# Patient Record
Sex: MALE | Race: WHITE | Employment: FULL TIME | URBAN - METROPOLITAN AREA
[De-identification: names, ages, dates, MRNs, and addresses within clinical notes are randomized per-mention and may not be internally consistent; named-entity substitution may affect disease eponyms.]

---

## 2019-06-08 ENCOUNTER — APPOINTMENT (OUTPATIENT)
Dept: GENERAL RADIOLOGY | Age: 44
End: 2019-06-08
Payer: COMMERCIAL

## 2019-06-08 ENCOUNTER — HOSPITAL ENCOUNTER (EMERGENCY)
Age: 44
Discharge: HOME | End: 2019-06-08
Payer: COMMERCIAL

## 2019-06-08 VITALS
OXYGEN SATURATION: 100 % | HEIGHT: 71 IN | WEIGHT: 165 LBS | BODY MASS INDEX: 23.1 KG/M2 | TEMPERATURE: 98.6 F | DIASTOLIC BLOOD PRESSURE: 99 MMHG | RESPIRATION RATE: 16 BRPM | SYSTOLIC BLOOD PRESSURE: 140 MMHG | HEART RATE: 74 BPM

## 2019-06-08 DIAGNOSIS — R00.2 PALPITATIONS: Primary | ICD-10-CM

## 2019-06-08 LAB
ALBUMIN SERPL-MCNC: 4.1 G/DL (ref 3.5–4.6)
ALP BLD-CCNC: 67 U/L (ref 35–104)
ALT SERPL-CCNC: 14 U/L (ref 0–41)
AMPHETAMINE SCREEN, URINE: ABNORMAL
ANION GAP SERPL CALCULATED.3IONS-SCNC: 8 MEQ/L (ref 9–15)
AST SERPL-CCNC: 18 U/L (ref 0–40)
BARBITURATE SCREEN URINE: ABNORMAL
BASOPHILS ABSOLUTE: 0.1 K/UL (ref 0–0.2)
BASOPHILS RELATIVE PERCENT: 0.8 %
BENZODIAZEPINE SCREEN, URINE: ABNORMAL
BILIRUB SERPL-MCNC: 0.6 MG/DL (ref 0.2–0.7)
BILIRUBIN URINE: NEGATIVE
BLOOD, URINE: NEGATIVE
BUN BLDV-MCNC: 15 MG/DL (ref 6–20)
CALCIUM SERPL-MCNC: 9 MG/DL (ref 8.5–9.9)
CANNABINOID SCREEN URINE: POSITIVE
CHLORIDE BLD-SCNC: 101 MEQ/L (ref 95–107)
CLARITY: CLEAR
CO2: 24 MEQ/L (ref 20–31)
COCAINE METABOLITE SCREEN URINE: ABNORMAL
COLOR: YELLOW
CREAT SERPL-MCNC: 0.78 MG/DL (ref 0.7–1.2)
D DIMER: <0.19 MG/L FEU (ref 0–0.5)
EKG ATRIAL RATE: 69 BPM
EKG P AXIS: 48 DEGREES
EKG P-R INTERVAL: 142 MS
EKG Q-T INTERVAL: 388 MS
EKG QRS DURATION: 86 MS
EKG QTC CALCULATION (BAZETT): 415 MS
EKG R AXIS: 36 DEGREES
EKG T AXIS: 56 DEGREES
EKG VENTRICULAR RATE: 69 BPM
EOSINOPHILS ABSOLUTE: 0.3 K/UL (ref 0–0.7)
EOSINOPHILS RELATIVE PERCENT: 2.7 %
ETHANOL PERCENT: NORMAL G/DL
ETHANOL: <10 MG/DL (ref 0–0.08)
GFR AFRICAN AMERICAN: >60
GFR NON-AFRICAN AMERICAN: >60
GLOBULIN: 2.5 G/DL (ref 2.3–3.5)
GLUCOSE BLD-MCNC: 93 MG/DL (ref 70–99)
GLUCOSE URINE: NEGATIVE MG/DL
HCT VFR BLD CALC: 41.8 % (ref 42–52)
HEMOGLOBIN: 14.8 G/DL (ref 14–18)
KETONES, URINE: ABNORMAL MG/DL
LEUKOCYTE ESTERASE, URINE: NEGATIVE
LYMPHOCYTES ABSOLUTE: 2 K/UL (ref 1–4.8)
LYMPHOCYTES RELATIVE PERCENT: 19.3 %
Lab: ABNORMAL
MAGNESIUM: 2.3 MG/DL (ref 1.7–2.4)
MCH RBC QN AUTO: 32.5 PG (ref 27–31.3)
MCHC RBC AUTO-ENTMCNC: 35.5 % (ref 33–37)
MCV RBC AUTO: 91.7 FL (ref 80–100)
MONOCYTES ABSOLUTE: 1 K/UL (ref 0.2–0.8)
MONOCYTES RELATIVE PERCENT: 9.4 %
NEUTROPHILS ABSOLUTE: 6.9 K/UL (ref 1.4–6.5)
NEUTROPHILS RELATIVE PERCENT: 67.8 %
NITRITE, URINE: NEGATIVE
OPIATE SCREEN URINE: ABNORMAL
PDW BLD-RTO: 12.6 % (ref 11.5–14.5)
PH UA: 7.5 (ref 5–9)
PHENCYCLIDINE SCREEN URINE: ABNORMAL
PLATELET # BLD: 279 K/UL (ref 130–400)
POTASSIUM SERPL-SCNC: 4 MEQ/L (ref 3.4–4.9)
PROTEIN UA: NEGATIVE MG/DL
RBC # BLD: 4.56 M/UL (ref 4.7–6.1)
SODIUM BLD-SCNC: 133 MEQ/L (ref 135–144)
SPECIFIC GRAVITY UA: 1.02 (ref 1–1.03)
TOTAL CK: 183 U/L (ref 0–190)
TOTAL PROTEIN: 6.6 G/DL (ref 6.3–8)
TROPONIN: <0.01 NG/ML (ref 0–0.01)
TSH REFLEX: 1.52 UIU/ML (ref 0.44–3.86)
URINE REFLEX TO CULTURE: ABNORMAL
UROBILINOGEN, URINE: 1 E.U./DL
WBC # BLD: 10.1 K/UL (ref 4.8–10.8)

## 2019-06-08 PROCEDURE — 2580000003 HC RX 258: Performed by: NURSE PRACTITIONER

## 2019-06-08 PROCEDURE — 71045 X-RAY EXAM CHEST 1 VIEW: CPT

## 2019-06-08 PROCEDURE — 93005 ELECTROCARDIOGRAM TRACING: CPT | Performed by: NURSE PRACTITIONER

## 2019-06-08 PROCEDURE — 84484 ASSAY OF TROPONIN QUANT: CPT

## 2019-06-08 PROCEDURE — 82550 ASSAY OF CK (CPK): CPT

## 2019-06-08 PROCEDURE — 85025 COMPLETE CBC W/AUTO DIFF WBC: CPT

## 2019-06-08 PROCEDURE — 81003 URINALYSIS AUTO W/O SCOPE: CPT

## 2019-06-08 PROCEDURE — 85379 FIBRIN DEGRADATION QUANT: CPT

## 2019-06-08 PROCEDURE — 36415 COLL VENOUS BLD VENIPUNCTURE: CPT

## 2019-06-08 PROCEDURE — 80053 COMPREHEN METABOLIC PANEL: CPT

## 2019-06-08 PROCEDURE — G0480 DRUG TEST DEF 1-7 CLASSES: HCPCS

## 2019-06-08 PROCEDURE — 80307 DRUG TEST PRSMV CHEM ANLYZR: CPT

## 2019-06-08 PROCEDURE — 84443 ASSAY THYROID STIM HORMONE: CPT

## 2019-06-08 PROCEDURE — 99285 EMERGENCY DEPT VISIT HI MDM: CPT

## 2019-06-08 PROCEDURE — 83735 ASSAY OF MAGNESIUM: CPT

## 2019-06-08 RX ORDER — 0.9 % SODIUM CHLORIDE 0.9 %
1000 INTRAVENOUS SOLUTION INTRAVENOUS ONCE
Status: COMPLETED | OUTPATIENT
Start: 2019-06-08 | End: 2019-06-08

## 2019-06-08 RX ADMIN — SODIUM CHLORIDE 1000 ML: 9 INJECTION, SOLUTION INTRAVENOUS at 16:37

## 2019-06-08 ASSESSMENT — ENCOUNTER SYMPTOMS
VOMITING: 0
EYE PAIN: 0
ABDOMINAL PAIN: 0
DIARRHEA: 0
COUGH: 0
SHORTNESS OF BREATH: 0
BACK PAIN: 0
SORE THROAT: 0
PHOTOPHOBIA: 0
NAUSEA: 0
RHINORRHEA: 0

## 2019-06-08 NOTE — ED TRIAGE NOTES
Pt presents to the ED with c/o tachycardia. Pt states he was driving home from work last night and he felt a fluttering sensation in his chest. Upon assessment, pt is A/Ox4, skin p/w/d, resp even and unlabored.

## 2019-06-08 NOTE — ED PROVIDER NOTES
[facility] ED  eMERGENCY dEPARTMENT eNCOUnter      Pt Name: Bong Prater  MRN: [id number]  [place] 1975  Date of evaluation: 6/8/2019  Provider: LENY Ramires - CNP    CHIEF COMPLAINT       Chief Complaint   Patient presents with    Tachycardia     started last night after work         HISTORY OF PRESENT ILLNESS   (Location/Symptom, Timing/Onset,Context/Setting, Quality, Duration, Modifying Factors, Severity)  Note limiting factors. Bong Prater is a 41 y.o. male who presents to the emergency department with complaint of palpitations. He admits that he started having palpitations yesterday while at work which she feels is due to increased stress. He feels his heart is pounding and fast and rate. He admits to multiple stressors within his life that includes an upcoming birthday of his daughter who will be turning 1, transfer to another location for work. He denies any headache dizziness lightheadedness fever sweats or chills chest pain shortness of breath difficulty breathing and orthopnea and dyspnea on exertion nausea vomiting diarrhea constipation and abdominal pain or diaphoresis. No recent extended travel or lower extremity, upper extremity immobilization      Location/Symptom - palpitations  Timing/Onset - yesterday  Context/Setting - as above  Quality - pounding, fast  Duration - multiple episodes each with a different duration  Modifying Factors - stress, otherwise none  Severity - moderate    Nursing Notes were reviewed. REVIEW OF SYSTEMS    (2-9 systems for level 4, 10 or more for level 5)     Review of Systems   Constitutional: Negative for chills, diaphoresis, fatigue and fever. HENT: Negative for congestion, rhinorrhea and sore throat. Eyes: Negative for photophobia and pain. Respiratory: Negative for cough and shortness of breath. Cardiovascular: Positive for palpitations. Negative for chest pain.    Gastrointestinal: Negative for abdominal pain, diarrhea, 1613]   BP Temp Temp Source Pulse Resp SpO2 Height Weight   (!) 128/93 98.6 °F (37 °C) Oral 83 18 97 % 5' 11\" (1.803 m) 165 lb (74.8 kg)       Physical Exam   Constitutional: He is oriented to person, place, and time. He appears well-developed and well-nourished. He is active. No distress. HENT:   Head: Normocephalic and atraumatic. Mouth/Throat: Mucous membranes are normal.   Eyes: Conjunctivae and lids are normal.   Neck: Normal range of motion. Neck supple. Cardiovascular: Normal rate, regular rhythm, normal heart sounds, intact distal pulses and normal pulses. Pulmonary/Chest: Effort normal and breath sounds normal.   Abdominal: Soft. Normal appearance and bowel sounds are normal. There is no tenderness. Lymphadenopathy:     He has no cervical adenopathy. Neurological: He is alert and oriented to person, place, and time. He has normal strength. Skin: Skin is warm, dry and intact. Capillary refill takes less than 2 seconds. No rash noted. He is not diaphoretic. Psychiatric: Judgment and thought content normal.   Nursing note and vitals reviewed. DIAGNOSTIC RESULTS     EKG: All EKG's are interpreted by the Emergency Department Physician who either signs or Co-signsthis chart in the absence of a cardiologist.    Normal sinus rhythm at 69 bpm.  No acute ST segment changes    RADIOLOGY:   Non-plain filmimages such as CT, Ultrasound and MRI are read by the radiologist. Plain radiographic images are visualized and preliminarily interpreted by the emergency physician with the below findings:    Chest X-Ray demonstrates no acute infiltrate, effusion or pneumothorax.     Interpretation per the Radiologist below, if available at the time ofthis note:    XR CHEST PORTABLE    (Results Pending)         ED BEDSIDE ULTRASOUND:   Performed by ED Physician - none    LABS:  Labs Reviewed   CBC WITH AUTO DIFFERENTIAL - Abnormal; Notable for the following components:       Result Value    RBC 4.56 (*) Hematocrit 41.8 (*)     MCH 32.5 (*)     Neutrophils # 6.9 (*)     Monocytes # 1.0 (*)     All other components within normal limits   COMPREHENSIVE METABOLIC PANEL - Abnormal; Notable for the following components:    Sodium 133 (*)     Anion Gap 8 (*)     All other components within normal limits   TROPONIN   CK   MAGNESIUM   TSH WITH REFLEX   D-DIMER, QUANTITATIVE   ETHANOL   URINE RT REFLEX TO CULTURE   URINE DRUG SCREEN       All other labs were within normal range or not returned as of this dictation. EMERGENCY DEPARTMENT COURSE and DIFFERENTIAL DIAGNOSIS/MDM:   Vitals:    Vitals:    06/08/19 1613   BP: (!) 128/93   Pulse: 83   Resp: 18   Temp: 98.6 °F (37 °C)   TempSrc: Oral   SpO2: 97%   Weight: 165 lb (74.8 kg)   Height: 5' 11\" (1.803 m)            MDM on exam patient is nontoxic in no distress. Vital signs are normal.  Exam is grossly unremarkable. Given his complaint of palpitations laboratory and radiology studies were obtained for evaluation of acute pathology. He was provided IV fluid. He'll be reevaluated. Reevaluated. No arrythmia t/o ed evaluation. No complaints of palpitations while in ED. W/u negative for acute pathology. Will advised to f/u with PCP and cards. Extended discharge instructions provided to patient including signs, symptoms and red flags that they should return to the emergency department. They express good understanding. Standard anticipatory guidance given to patient upon discharge. Have given them a specific time frame in which to follow-up and who to follow-up with. I have also advised them that they should return to the emergency department if they get worse, or not getting better or develop any new or concerning symptoms. Patient demonstrates understanding and all questions were answered. CRITICAL CARE TIME       CONSULTS:  None    PROCEDURES:  Unless otherwise noted below, none     Procedures    FINAL IMPRESSION      1.  Palpitations          DISPOSITION/PLAN DISPOSITION Decision To Discharge 06/08/2019 05:25:51 PM      PATIENT REFERRED TO:  PCP    Call in 2 days  For continued evaluation and management    DO Lyn  [address]  [address] [facility]  [phone]    Call in 2 days  For continued evaluation and management      DISCHARGE MEDICATIONS:  New Prescriptions    No medications on file          (Please notethat portions of this note were completed with a voice recognition program.  Efforts were made to edit the dictations but occasionally words are mis-transcribed.)    LENY Haley CNP (electronically signed)  Attending Emergency Physician          LENY Haley CNP  06/08/19 2543

## 2019-06-08 NOTE — ED NOTES
Pt aware urine specimen is needed. Pt states he is unable to go at this time.      Cole Paulino RN  06/08/19 [id number]

## 2019-06-10 PROCEDURE — 93010 ELECTROCARDIOGRAM REPORT: CPT | Performed by: INTERNAL MEDICINE

## 2023-12-12 ENCOUNTER — OFFICE VISIT (OUTPATIENT)
Dept: URGENT CARE | Facility: CLINIC | Age: 48
End: 2023-12-12
Payer: COMMERCIAL

## 2023-12-12 VITALS
DIASTOLIC BLOOD PRESSURE: 100 MMHG | TEMPERATURE: 98.5 F | RESPIRATION RATE: 18 BRPM | HEART RATE: 77 BPM | SYSTOLIC BLOOD PRESSURE: 148 MMHG | OXYGEN SATURATION: 98 %

## 2023-12-12 DIAGNOSIS — H69.92 EUSTACHIAN TUBE DYSFUNCTION, LEFT: ICD-10-CM

## 2023-12-12 DIAGNOSIS — H00.014 HORDEOLUM EXTERNUM OF LEFT UPPER EYELID: Primary | ICD-10-CM

## 2023-12-12 PROCEDURE — 99204 OFFICE O/P NEW MOD 45 MIN: CPT | Performed by: PHYSICIAN ASSISTANT

## 2023-12-12 RX ORDER — FLUTICASONE PROPIONATE 50 MCG
2 SPRAY, SUSPENSION (ML) NASAL DAILY
Qty: 16 G | Refills: 0 | Status: SHIPPED | OUTPATIENT
Start: 2023-12-12 | End: 2024-12-11

## 2023-12-12 RX ORDER — ERYTHROMYCIN 5 MG/G
OINTMENT OPHTHALMIC 3 TIMES DAILY
Qty: 3.5 G | Refills: 0 | Status: SHIPPED | OUTPATIENT
Start: 2023-12-12 | End: 2023-12-17

## 2023-12-12 ASSESSMENT — ENCOUNTER SYMPTOMS
ALLERGIC/IMMUNOLOGIC NEGATIVE: 1
NEUROLOGICAL NEGATIVE: 1
ENDOCRINE NEGATIVE: 1
CARDIOVASCULAR NEGATIVE: 1
PSYCHIATRIC NEGATIVE: 1
EYE PAIN: 0
COUGH: 0
EYE DISCHARGE: 0
GASTROINTESTINAL NEGATIVE: 1
MUSCULOSKELETAL NEGATIVE: 1
HEMATOLOGIC/LYMPHATIC NEGATIVE: 1
FEVER: 1
ADENOPATHY: 0

## 2023-12-12 NOTE — PATIENT INSTRUCTIONS
OTC decongestant as directed  Pcp follow up this week if not improving or worsening  ER visit anytime 24/7 for acute worsening or changing condition

## 2023-12-12 NOTE — PROGRESS NOTES
"Subjective   Patient ID: Wilfredo Hinds is a 48 y.o. male.      History provided by:  Patient   used: No    Ear Fullness  Associated symptoms: congestion and fever    Associated symptoms: no cough and no ear pain      This is a 48 yr old male here for low grade temp, left upper eyelid \"lump\", left ear \"plugged up\" and clear nasal congestion x 4 days. No cough, ear pain, or sore throat. No globe pain or eye drainage.     Review of Systems   Constitutional:  Positive for fever.   HENT:  Positive for congestion. Negative for ear pain.    Eyes:  Negative for pain and discharge.   Respiratory:  Negative for cough.    Cardiovascular: Negative.    Gastrointestinal: Negative.    Endocrine: Negative.    Genitourinary: Negative.    Musculoskeletal: Negative.    Skin: Negative.    Allergic/Immunologic: Negative.    Neurological: Negative.    Hematological: Negative.  Negative for adenopathy.   Psychiatric/Behavioral: Negative.     All other systems reviewed and are negative.  No past medical history on file.  No current outpatient medications on file prior to visit.     No current facility-administered medications on file prior to visit.     No Known Allergies  BP (!) 148/100   Pulse 77   Temp 36.9 °C (98.5 °F)   Resp 18   SpO2 98%   Objective   Physical Exam  Vitals and nursing note reviewed.   Constitutional:       Appearance: Normal appearance.   HENT:      Head: Normocephalic and atraumatic.      Right Ear: Tympanic membrane and ear canal normal.      Left Ear: Ear canal normal.      Ears:      Comments: Left ear-TM with serous fluid, no TM erythema     Mouth/Throat:      Mouth: Mucous membranes are moist.      Pharynx: Oropharynx is clear.   Eyes:      Conjunctiva/sclera: Conjunctivae normal.      Pupils: Pupils are equal, round, and reactive to light.      Comments: Left upper eyelid with stye vs early chalazion formation   Cardiovascular:      Rate and Rhythm: Normal rate and regular rhythm. "   Pulmonary:      Effort: Pulmonary effort is normal.      Breath sounds: Normal breath sounds.   Musculoskeletal:      Cervical back: Neck supple.   Lymphadenopathy:      Cervical: No cervical adenopathy.   Skin:     General: Skin is warm and dry.   Neurological:      General: No focal deficit present.      Mental Status: He is alert and oriented to person, place, and time.   Psychiatric:         Mood and Affect: Mood normal.         Behavior: Behavior normal.     Assessment:  Stye left upper eyelid  Left eustachian tube dysfunction    Plan:  Erythromycin ophthalmic ointment tid  OTC decongestant/antihistamine as directed  Flonase nasal spray as directed  Pcp follow up this week if not improving or worsening  ER visit anytime 24/7 for acute worsening or changing condition

## 2023-12-26 ENCOUNTER — OFFICE VISIT (OUTPATIENT)
Dept: URGENT CARE | Facility: CLINIC | Age: 48
End: 2023-12-26
Payer: COMMERCIAL

## 2023-12-26 VITALS
SYSTOLIC BLOOD PRESSURE: 124 MMHG | WEIGHT: 165 LBS | BODY MASS INDEX: 25.09 KG/M2 | TEMPERATURE: 98.4 F | RESPIRATION RATE: 16 BRPM | OXYGEN SATURATION: 97 % | HEART RATE: 87 BPM | DIASTOLIC BLOOD PRESSURE: 89 MMHG

## 2023-12-26 DIAGNOSIS — J32.9 SINUSITIS, UNSPECIFIED CHRONICITY, UNSPECIFIED LOCATION: Primary | ICD-10-CM

## 2023-12-26 DIAGNOSIS — H00.16 ACUTE CHALAZION OF LEFT EYE: ICD-10-CM

## 2023-12-26 PROCEDURE — 99214 OFFICE O/P EST MOD 30 MIN: CPT | Performed by: PHYSICIAN ASSISTANT

## 2023-12-26 RX ORDER — AMOXICILLIN 875 MG/1
875 TABLET, FILM COATED ORAL 2 TIMES DAILY
Qty: 20 TABLET | Refills: 0 | Status: SHIPPED | OUTPATIENT
Start: 2023-12-26 | End: 2024-01-05

## 2023-12-26 ASSESSMENT — PAIN SCALES - GENERAL: PAINLEVEL: 6

## 2023-12-26 ASSESSMENT — ENCOUNTER SYMPTOMS
COUGH: 0
GASTROINTESTINAL NEGATIVE: 1
CARDIOVASCULAR NEGATIVE: 1
HEMATOLOGIC/LYMPHATIC NEGATIVE: 1
ALLERGIC/IMMUNOLOGIC NEGATIVE: 1
ENDOCRINE NEGATIVE: 1
SORE THROAT: 0
PSYCHIATRIC NEGATIVE: 1
EYES NEGATIVE: 1
MUSCULOSKELETAL NEGATIVE: 1
NEUROLOGICAL NEGATIVE: 1

## 2023-12-26 NOTE — PATIENT INSTRUCTIONS
Continue flonase spray as directed  OTC decongestant as directed  See ophthalmology about eyelid cyst  Pcp follow up this week if not improving or worsening  ER visit anytime 24/7 for acute worsening or changing condition

## 2023-12-26 NOTE — PROGRESS NOTES
"Subjective   Patient ID: Wilfredo Hinds is a 48 y.o. male.      History provided by:  Patient   used: No    Earache   Pertinent negatives include no coughing or sore throat.   .edhpi  This is a 48 yr old male here for persistant sxs. Left upper eyelid \"\"lump\" persists despite using antibiotic eye ointment. He also has left ear pain, yellow/clear nasal congestion that has been persistant x 1 month. No sore throat or cough. Using OTC decongestant and flonase spray without relief.   Review of Systems   HENT:  Positive for congestion and ear pain. Negative for sore throat.    Eyes: Negative.    Respiratory:  Negative for cough.    Cardiovascular: Negative.    Gastrointestinal: Negative.    Endocrine: Negative.    Genitourinary: Negative.    Musculoskeletal: Negative.    Skin: Negative.    Allergic/Immunologic: Negative.    Neurological: Negative.    Hematological: Negative.    Psychiatric/Behavioral: Negative.     All other systems reviewed and are negative.  /89   Pulse 87   Temp 36.9 °C (98.4 °F)   Resp 16   Wt 74.8 kg (165 lb)   SpO2 97%   BMI 25.09 kg/m²     Objective   Physical Exam  Vitals and nursing note reviewed.   Constitutional:       Appearance: Normal appearance.   HENT:      Head: Normocephalic and atraumatic.      Right Ear: Tympanic membrane and ear canal normal.      Left Ear: Tympanic membrane and ear canal normal.      Mouth/Throat:      Mouth: Mucous membranes are moist.      Pharynx: Oropharynx is clear.   Eyes:      Conjunctiva/sclera: Conjunctivae normal.      Comments: Small chalazion left upper eyelid,   Cardiovascular:      Rate and Rhythm: Normal rate and regular rhythm.   Pulmonary:      Effort: Pulmonary effort is normal.      Breath sounds: Normal breath sounds.   Musculoskeletal:      Cervical back: Neck supple.   Lymphadenopathy:      Cervical: No cervical adenopathy.   Skin:     General: Skin is warm and dry.   Neurological:      General: No focal deficit " present.      Mental Status: He is alert and oriented to person, place, and time.   Psychiatric:         Mood and Affect: Mood normal.         Behavior: Behavior normal.     Assessment:  Chalazion left upper eyelid  Sinusitis    Plan:  Amoxicillin 875 mg bid x 10 days  Continue flonase and decongestant rx as directed  Ophthalmology follow for eye  Pcp follow up this week if not improving or worsening  ER visit anytime 24/7 for acute worsening or changing condition

## 2024-08-30 ENCOUNTER — APPOINTMENT (OUTPATIENT)
Dept: PRIMARY CARE | Facility: CLINIC | Age: 49
End: 2024-08-30
Payer: COMMERCIAL

## 2024-08-30 ENCOUNTER — LAB (OUTPATIENT)
Dept: LAB | Facility: LAB | Age: 49
End: 2024-08-30
Payer: COMMERCIAL

## 2024-08-30 VITALS
SYSTOLIC BLOOD PRESSURE: 128 MMHG | HEART RATE: 64 BPM | WEIGHT: 157 LBS | BODY MASS INDEX: 23.79 KG/M2 | TEMPERATURE: 98.1 F | DIASTOLIC BLOOD PRESSURE: 88 MMHG | HEIGHT: 68 IN | RESPIRATION RATE: 20 BRPM

## 2024-08-30 DIAGNOSIS — Z23 IMMUNIZATION DUE: ICD-10-CM

## 2024-08-30 DIAGNOSIS — Z00.00 ENCOUNTER FOR ANNUAL PHYSICAL EXAM: ICD-10-CM

## 2024-08-30 DIAGNOSIS — R49.0 HOARSENESS: ICD-10-CM

## 2024-08-30 DIAGNOSIS — Z12.11 COLON CANCER SCREENING: ICD-10-CM

## 2024-08-30 DIAGNOSIS — H91.93 BILATERAL HEARING LOSS, UNSPECIFIED HEARING LOSS TYPE: ICD-10-CM

## 2024-08-30 DIAGNOSIS — Z00.00 ENCOUNTER FOR ANNUAL PHYSICAL EXAM: Primary | ICD-10-CM

## 2024-08-30 DIAGNOSIS — Z13.31 SCREENING FOR DEPRESSION: ICD-10-CM

## 2024-08-30 DIAGNOSIS — H54.7 DECREASED VISUAL ACUITY: ICD-10-CM

## 2024-08-30 DIAGNOSIS — Z72.0 TOBACCO USE: ICD-10-CM

## 2024-08-30 PROBLEM — I99.8 ISCHEMIC FINGER: Status: ACTIVE | Noted: 2024-08-30

## 2024-08-30 PROBLEM — M77.9 BONE SPUR: Status: RESOLVED | Noted: 2024-08-30 | Resolved: 2024-08-30

## 2024-08-30 PROBLEM — H91.90 HEARING LOSS: Status: ACTIVE | Noted: 2024-08-30

## 2024-08-30 PROBLEM — E55.9 VITAMIN D DEFICIENCY: Status: ACTIVE | Noted: 2024-08-30

## 2024-08-30 PROBLEM — J06.9 URI WITH COUGH AND CONGESTION: Status: RESOLVED | Noted: 2024-08-30 | Resolved: 2024-08-30

## 2024-08-30 PROBLEM — E78.1 HIGH TRIGLYCERIDES: Status: ACTIVE | Noted: 2024-08-30

## 2024-08-30 LAB
25(OH)D3 SERPL-MCNC: 28 NG/ML (ref 30–100)
ALBUMIN SERPL BCP-MCNC: 4.2 G/DL (ref 3.4–5)
ALP SERPL-CCNC: 60 U/L (ref 33–120)
ALT SERPL W P-5'-P-CCNC: 34 U/L (ref 10–52)
ANION GAP SERPL CALC-SCNC: 15 MMOL/L (ref 10–20)
AST SERPL W P-5'-P-CCNC: 36 U/L (ref 9–39)
BILIRUB SERPL-MCNC: 1.1 MG/DL (ref 0–1.2)
BUN SERPL-MCNC: 18 MG/DL (ref 6–23)
CALCIUM SERPL-MCNC: 9.4 MG/DL (ref 8.6–10.3)
CHLORIDE SERPL-SCNC: 104 MMOL/L (ref 98–107)
CHOLEST SERPL-MCNC: 125 MG/DL (ref 0–199)
CHOLESTEROL/HDL RATIO: 2.5
CO2 SERPL-SCNC: 23 MMOL/L (ref 21–32)
CREAT SERPL-MCNC: 0.98 MG/DL (ref 0.5–1.3)
EGFRCR SERPLBLD CKD-EPI 2021: >90 ML/MIN/1.73M*2
ERYTHROCYTE [DISTWIDTH] IN BLOOD BY AUTOMATED COUNT: 13.2 % (ref 11.5–14.5)
GLUCOSE SERPL-MCNC: 99 MG/DL (ref 74–99)
HCT VFR BLD AUTO: 44.8 % (ref 41–52)
HDLC SERPL-MCNC: 49.2 MG/DL
HGB BLD-MCNC: 14.9 G/DL (ref 13.5–17.5)
LDLC SERPL CALC-MCNC: 34 MG/DL
MCH RBC QN AUTO: 31.8 PG (ref 26–34)
MCHC RBC AUTO-ENTMCNC: 33.3 G/DL (ref 32–36)
MCV RBC AUTO: 96 FL (ref 80–100)
NON HDL CHOLESTEROL: 76 MG/DL (ref 0–149)
NRBC BLD-RTO: 0 /100 WBCS (ref 0–0)
PLATELET # BLD AUTO: 374 X10*3/UL (ref 150–450)
POTASSIUM SERPL-SCNC: 4.1 MMOL/L (ref 3.5–5.3)
PROT SERPL-MCNC: 7 G/DL (ref 6.4–8.2)
RBC # BLD AUTO: 4.69 X10*6/UL (ref 4.5–5.9)
SODIUM SERPL-SCNC: 138 MMOL/L (ref 136–145)
TRIGL SERPL-MCNC: 209 MG/DL (ref 0–149)
TSH SERPL-ACNC: 2.41 MIU/L (ref 0.44–3.98)
VLDL: 42 MG/DL (ref 0–40)
WBC # BLD AUTO: 15.2 X10*3/UL (ref 4.4–11.3)

## 2024-08-30 PROCEDURE — 80053 COMPREHEN METABOLIC PANEL: CPT

## 2024-08-30 PROCEDURE — 36415 COLL VENOUS BLD VENIPUNCTURE: CPT

## 2024-08-30 PROCEDURE — 84443 ASSAY THYROID STIM HORMONE: CPT

## 2024-08-30 PROCEDURE — 99213 OFFICE O/P EST LOW 20 MIN: CPT | Performed by: FAMILY MEDICINE

## 2024-08-30 PROCEDURE — 90715 TDAP VACCINE 7 YRS/> IM: CPT | Performed by: FAMILY MEDICINE

## 2024-08-30 PROCEDURE — 96127 BRIEF EMOTIONAL/BEHAV ASSMT: CPT | Performed by: FAMILY MEDICINE

## 2024-08-30 PROCEDURE — 99396 PREV VISIT EST AGE 40-64: CPT | Performed by: FAMILY MEDICINE

## 2024-08-30 PROCEDURE — 85027 COMPLETE CBC AUTOMATED: CPT

## 2024-08-30 PROCEDURE — 90471 IMMUNIZATION ADMIN: CPT | Performed by: FAMILY MEDICINE

## 2024-08-30 PROCEDURE — 82306 VITAMIN D 25 HYDROXY: CPT

## 2024-08-30 PROCEDURE — 80061 LIPID PANEL: CPT

## 2024-08-30 RX ORDER — BUPROPION HYDROCHLORIDE 150 MG/1
150 TABLET, EXTENDED RELEASE ORAL 2 TIMES DAILY
Qty: 60 TABLET | Refills: 11 | Status: SHIPPED | OUTPATIENT
Start: 2024-08-30 | End: 2025-08-30

## 2024-08-30 ASSESSMENT — PATIENT HEALTH QUESTIONNAIRE - PHQ9
1. LITTLE INTEREST OR PLEASURE IN DOING THINGS: NOT AT ALL
2. FEELING DOWN, DEPRESSED OR HOPELESS: NOT AT ALL
SUM OF ALL RESPONSES TO PHQ9 QUESTIONS 1 AND 2: 0

## 2024-08-30 ASSESSMENT — ENCOUNTER SYMPTOMS
WHEEZING: 0
COUGH: 0
DYSPHORIC MOOD: 0
FATIGUE: 0
RHINORRHEA: 0
HEMATURIA: 0
BLOOD IN STOOL: 0
ARTHRALGIAS: 1
VOMITING: 0
BRUISES/BLEEDS EASILY: 0
FEVER: 0
DIFFICULTY URINATING: 0
PALPITATIONS: 0
NERVOUS/ANXIOUS: 0
SHORTNESS OF BREATH: 0
NAUSEA: 0
SORE THROAT: 0
CONSTIPATION: 0
BACK PAIN: 0
SEIZURES: 0
DIARRHEA: 0

## 2024-08-30 NOTE — ASSESSMENT & PLAN NOTE
Encouraged to quit  Pt would like to quit, tried chantix ,had side effects  Ho printed  Pt would like to try zyban

## 2024-08-30 NOTE — PROGRESS NOTES
"Subjective   Patient ID: Wilfredo Hinds is a 48 y.o. male who presents for Annual Exam.    HPI     Review of Systems   Constitutional:  Negative for fatigue and fever.   HENT:  Positive for hearing loss. Negative for congestion, ear pain, rhinorrhea and sore throat.         Pt has throat clearing, horseness  Pt is a smoker   Eyes:  Positive for visual disturbance.        Pt needs eye appt   Respiratory:  Negative for cough, shortness of breath and wheezing.    Cardiovascular:  Negative for chest pain and palpitations.   Gastrointestinal:  Negative for blood in stool, constipation, diarrhea, nausea and vomiting.   Endocrine: Negative for cold intolerance and heat intolerance.   Genitourinary:  Negative for difficulty urinating, hematuria, scrotal swelling and testicular pain.   Musculoskeletal:  Positive for arthralgias. Negative for back pain.        Left knee left arm   Skin:  Negative for rash.   Neurological:  Negative for seizures and syncope.   Hematological:  Does not bruise/bleed easily.   Psychiatric/Behavioral:  Negative for dysphoric mood and suicidal ideas. The patient is not nervous/anxious.        Objective   /88   Pulse 64   Temp 36.7 °C (98.1 °F)   Resp 20   Ht 1.727 m (5' 8\")   Wt 71.2 kg (157 lb)   BMI 23.87 kg/m²     Physical Exam  Vitals and nursing note reviewed.   Constitutional:       General: He is not in acute distress.     Appearance: Normal appearance.   HENT:      Head: Normocephalic and atraumatic.      Right Ear: Tympanic membrane, ear canal and external ear normal.      Left Ear: Tympanic membrane, ear canal and external ear normal.      Nose: Nose normal.      Mouth/Throat:      Mouth: Mucous membranes are moist.      Pharynx: Oropharynx is clear.   Eyes:      Extraocular Movements: Extraocular movements intact.      Conjunctiva/sclera: Conjunctivae normal.      Pupils: Pupils are equal, round, and reactive to light.   Cardiovascular:      Rate and Rhythm: Normal rate and " regular rhythm.      Heart sounds: Normal heart sounds.   Pulmonary:      Effort: Pulmonary effort is normal.      Breath sounds: Normal breath sounds.   Abdominal:      General: Abdomen is flat. Bowel sounds are normal.      Palpations: Abdomen is soft.   Musculoskeletal:         General: No deformity.      Cervical back: Neck supple.   Lymphadenopathy:      Cervical: No cervical adenopathy.   Skin:     General: Skin is warm and dry.   Neurological:      General: No focal deficit present.      Mental Status: He is alert.      Sensory: No sensory deficit.      Motor: No weakness.      Gait: Gait normal.   Psychiatric:         Mood and Affect: Mood normal.         Behavior: Behavior normal.         Assessment/Plan   Problem List Items Addressed This Visit             ICD-10-CM    Hearing loss H91.90    Relevant Orders    Referral to ENT    Encounter for annual physical exam - Primary Z00.00    Relevant Orders    Follow Up In [facility] - PCP - Health Maintenance    CBC    Comprehensive Metabolic Panel    Lipid Panel    TSH with reflex to Free T4 if abnormal    Vitamin D 25-Hydroxy,Total    Screening for depression Z13.31    Tobacco use Z72.0     Encouraged to quit  Pt would like to quit, tried chantix ,had side effects  Ho printed  Pt would like to try zyban         Relevant Medications    buPROPion SR (Wellbutrin SR) 150 mg 12 hr tablet     Other Visit Diagnoses         Codes    Colon cancer screening     Z12.11    Relevant Orders    Colonoscopy Screening; Average Risk Patient    Immunization due     Z23    Relevant Orders    Tdap vaccine, age 7 years and older (Completed)    Hoarseness     R49.0    Relevant Orders    Referral to ENT    Decreased visual acuity     H54.7    Relevant Orders    Referral to Ophthalmology             Patient reported health Good    Regular Dental Visits yes    Regular Eye Visits yes    Hearing Loss yes    Balanced Diet yes    Weight Concerns no    Exercise Regular

## 2024-09-06 DIAGNOSIS — E55.9 VITAMIN D DEFICIENCY: ICD-10-CM

## 2024-09-06 DIAGNOSIS — D72.829 LEUKOCYTOSIS, UNSPECIFIED TYPE: ICD-10-CM

## 2024-09-06 DIAGNOSIS — E78.1 HIGH TRIGLYCERIDES: ICD-10-CM

## 2024-09-06 NOTE — TELEPHONE ENCOUNTER
----- Message from Shea Holt sent at 9/3/2024  7:42 AM EDT -----  PT WITH LOW VIT D. START VIT D 50,000 international units  WEEKLY X 12 WK. THEN SWITCH TO OTC VIT D 400 international units  DAILY  Pt with high TG s start atorvastatin 10 mg daily and recheck lipids in 2 mo, start low fat/chol diet and exercise most days  Wbc elevated, does pt have any cold symptoms other than what was discussed at pe?  Recheck cbc in 2 wk

## 2024-09-10 RX ORDER — ATORVASTATIN CALCIUM 10 MG/1
10 TABLET, FILM COATED ORAL DAILY
Qty: 90 TABLET | Refills: 0 | Status: SHIPPED | OUTPATIENT
Start: 2024-09-10

## 2024-09-10 RX ORDER — ERGOCALCIFEROL 1.25 MG/1
50000 CAPSULE ORAL WEEKLY
Qty: 12 CAPSULE | Refills: 0 | Status: SHIPPED | OUTPATIENT
Start: 2024-09-10 | End: 2024-12-03

## 2024-09-18 ENCOUNTER — ANESTHESIA EVENT (OUTPATIENT)
Dept: GASTROENTEROLOGY | Facility: EXTERNAL LOCATION | Age: 49
End: 2024-09-18

## 2024-09-26 ENCOUNTER — APPOINTMENT (OUTPATIENT)
Dept: GASTROENTEROLOGY | Facility: EXTERNAL LOCATION | Age: 49
End: 2024-09-26
Payer: COMMERCIAL

## 2024-09-26 ENCOUNTER — ANESTHESIA (OUTPATIENT)
Dept: GASTROENTEROLOGY | Facility: EXTERNAL LOCATION | Age: 49
End: 2024-09-26

## 2024-09-26 VITALS
SYSTOLIC BLOOD PRESSURE: 104 MMHG | TEMPERATURE: 98.1 F | BODY MASS INDEX: 25.01 KG/M2 | DIASTOLIC BLOOD PRESSURE: 70 MMHG | RESPIRATION RATE: 12 BRPM | HEART RATE: 75 BPM | WEIGHT: 165 LBS | HEIGHT: 68 IN | OXYGEN SATURATION: 96 %

## 2024-09-26 DIAGNOSIS — Z12.11 COLON CANCER SCREENING: Primary | ICD-10-CM

## 2024-09-26 PROCEDURE — 45378 DIAGNOSTIC COLONOSCOPY: CPT | Performed by: INTERNAL MEDICINE

## 2024-09-26 RX ORDER — SODIUM CHLORIDE 9 MG/ML
INJECTION, SOLUTION INTRAVENOUS CONTINUOUS PRN
Status: DISCONTINUED | OUTPATIENT
Start: 2024-09-26 | End: 2024-09-26

## 2024-09-26 RX ORDER — SODIUM CHLORIDE 9 MG/ML
20 INJECTION, SOLUTION INTRAVENOUS CONTINUOUS
Status: DISCONTINUED | OUTPATIENT
Start: 2024-09-26 | End: 2024-09-27 | Stop reason: HOSPADM

## 2024-09-26 RX ORDER — PROPOFOL 10 MG/ML
INJECTION, EMULSION INTRAVENOUS AS NEEDED
Status: DISCONTINUED | OUTPATIENT
Start: 2024-09-26 | End: 2024-09-26

## 2024-09-26 RX ORDER — LIDOCAINE HYDROCHLORIDE 20 MG/ML
INJECTION, SOLUTION INFILTRATION; PERINEURAL AS NEEDED
Status: DISCONTINUED | OUTPATIENT
Start: 2024-09-26 | End: 2024-09-26

## 2024-09-26 SDOH — HEALTH STABILITY: MENTAL HEALTH: CURRENT SMOKER: 1

## 2024-09-26 ASSESSMENT — PAIN SCALES - GENERAL
PAINLEVEL_OUTOF10: 0 - NO PAIN
PAINLEVEL_OUTOF10: 0 - NO PAIN
PAIN_LEVEL: 0
PAINLEVEL_OUTOF10: 0 - NO PAIN

## 2024-09-26 ASSESSMENT — PAIN - FUNCTIONAL ASSESSMENT
PAIN_FUNCTIONAL_ASSESSMENT: 0-10

## 2024-09-26 ASSESSMENT — COLUMBIA-SUICIDE SEVERITY RATING SCALE - C-SSRS
1. IN THE PAST MONTH, HAVE YOU WISHED YOU WERE DEAD OR WISHED YOU COULD GO TO SLEEP AND NOT WAKE UP?: NO
6. HAVE YOU EVER DONE ANYTHING, STARTED TO DO ANYTHING, OR PREPARED TO DO ANYTHING TO END YOUR LIFE?: NO
2. HAVE YOU ACTUALLY HAD ANY THOUGHTS OF KILLING YOURSELF?: NO

## 2024-09-26 NOTE — H&P
Outpatient NR Procedure H&P    Patient Profile  Name Wilfredo Hinds  Date of Birth 1975  MRN [id number]  PCP Shea Holt        Diagnosis: screening colon  Procedure(s): Colonoscopy     Allergies  No Known Allergies    Past Medical History   Past Medical History:   Diagnosis Date    Hyperlipidemia     URI with cough and congestion 08/30/2024       Medication Reviewed - yes  Prior to Admission medications    Medication Sig Start Date End Date Taking? Authorizing Provider   atorvastatin (Lipitor) 10 mg tablet Take 1 tablet (10 mg) by mouth once daily. 9/10/24   Shea Holt MD   buPROPion SR (Wellbutrin SR) 150 mg 12 hr tablet Take 1 tablet (150 mg) by mouth 2 times a day. Do not crush, chew, or split. 8/30/24 8/30/25  Shea Holt MD   ergocalciferol (Vitamin D-2) 1.25 MG (49075 UT) capsule Take 1 capsule (50,000 Units) by mouth 1 (one) time per week. 9/10/24 12/3/24  Shea Holt MD       Physical Exam  Vitals:    09/26/24 0918   BP: 120/85   Pulse: (!) 0   Resp: 14   Temp: 36.1 °C (97 °F)   SpO2: 96%      Weight   Vitals:    09/26/24 0918   Weight: 74.8 kg (165 lb)     BMI Body mass index is 25.09 kg/m².    General: A&Ox3, NAD.  HEENT: AT/NC.   CV: RRR. No murmur.  Resp: CTA bilaterally. No wheezing, rhonchi or rales.   GI: Soft, NT/ND. BSx4.  Extrem: No edema. Pulses intact.  Skin: No Jaundice.   Neuro: No focal deficits.   Psych: Normal mood and affect.        Oropharyngeal Classification I (soft palate, uvula, fauces, and tonsillar pillars visible)  ASA PS Classification 2  Sedation Plan: Deep Sedation.  Procedure Plan - pre-procedural (re)assesment completed by physician:  discharge/transfer patient when discharge criteria met    Regino De Los Santos MD  9/26/2024 10:15 AM

## 2024-09-26 NOTE — ANESTHESIA POSTPROCEDURE EVALUATION
Patient: Wilfredo Hinds    Procedure Summary       Date: 09/26/24 Room / Location: [facility]    Anesthesia Start: 1025 Anesthesia Stop: 1043    Procedure: COLONOSCOPY Diagnosis:       Colon cancer screening      Colon cancer screening    Scheduled Providers: Regino De Los Santos MD Responsible Provider: HUMPHREY Bowden    Anesthesia Type: MAC ASA Status: 2            Anesthesia Type: MAC    Vitals Value Taken Time   BP 92/59 09/26/24 1041   Temp 36.7 °C (98.1 °F) 09/26/24 1041   Pulse 65 09/26/24 1041   Resp 12 09/26/24 1041   SpO2 97 % 09/26/24 1041       Anesthesia Post Evaluation    Patient location during evaluation: PACU  Patient participation: waiting for patient participation  Level of consciousness: responsive to verbal stimuli  Pain score: 0  Pain management: adequate  Airway patency: patent  Cardiovascular status: blood pressure returned to baseline  Respiratory status: acceptable  Hydration status: acceptable  Postoperative Nausea and Vomiting: none    No notable events documented.

## 2024-09-26 NOTE — ANESTHESIA PREPROCEDURE EVALUATION
Patient: Wilfredo Hinds    Procedure Information       Date/Time: 09/26/24 0930    Scheduled providers: Regino De Los Santos MD    Procedure: COLONOSCOPY    Location: [facility]            Relevant Problems   Cardiac   (+) High triglycerides   (+) Ischemic finger      HEENT   (+) Hearing loss       Clinical information reviewed:   Tobacco  Allergies  Meds  Problems  Med Hx  Surg Hx   Fam Hx  Soc   Hx        NPO Detail:  NPO/Void Status  Date of Last Liquid: 09/26/24  Time of Last Liquid: 0400  Date of Last Solid: 09/24/24         Physical Exam    Airway  Mallampati: II  TM distance: >3 FB  Neck ROM: full     Cardiovascular - normal exam     Dental - normal exam     Pulmonary - normal exam     Abdominal - normal exam         Anesthesia Plan    History of general anesthesia?: no  History of complications of general anesthesia?: no    ASA 2     MAC     The patient is a current smoker.  Patient was previously instructed to abstain from smoking on day of procedure.  Patient did not smoke on day of procedure.    intravenous induction   Anesthetic plan and risks discussed with patient.

## 2024-09-26 NOTE — DISCHARGE INSTRUCTIONS

## 2024-10-09 ENCOUNTER — APPOINTMENT (OUTPATIENT)
Dept: OTOLARYNGOLOGY | Facility: CLINIC | Age: 49
End: 2024-10-09
Payer: COMMERCIAL

## 2024-10-09 ENCOUNTER — CLINICAL SUPPORT (OUTPATIENT)
Dept: AUDIOLOGY | Facility: CLINIC | Age: 49
End: 2024-10-09
Payer: COMMERCIAL

## 2024-10-09 VITALS
BODY MASS INDEX: 23.68 KG/M2 | HEIGHT: 70 IN | TEMPERATURE: 97.5 F | SYSTOLIC BLOOD PRESSURE: 158 MMHG | DIASTOLIC BLOOD PRESSURE: 105 MMHG

## 2024-10-09 DIAGNOSIS — H90.3 SENSORINEURAL HEARING LOSS (SNHL) OF BOTH EARS: ICD-10-CM

## 2024-10-09 DIAGNOSIS — H90.3 ASYMMETRICAL SENSORINEURAL HEARING LOSS: Primary | ICD-10-CM

## 2024-10-09 DIAGNOSIS — H90.3 BILATERAL SENSORINEURAL HEARING LOSS: ICD-10-CM

## 2024-10-09 PROCEDURE — 92550 TYMPANOMETRY & REFLEX THRESH: CPT | Performed by: AUDIOLOGIST

## 2024-10-09 PROCEDURE — 99203 OFFICE O/P NEW LOW 30 MIN: CPT | Performed by: OTOLARYNGOLOGY

## 2024-10-09 PROCEDURE — 92565 STENGER TEST PURE TONE: CPT | Performed by: AUDIOLOGIST

## 2024-10-09 PROCEDURE — 4004F PT TOBACCO SCREEN RCVD TLK: CPT | Performed by: OTOLARYNGOLOGY

## 2024-10-09 PROCEDURE — 92557 COMPREHENSIVE HEARING TEST: CPT | Performed by: AUDIOLOGIST

## 2024-10-09 ASSESSMENT — PAIN - FUNCTIONAL ASSESSMENT: PAIN_FUNCTIONAL_ASSESSMENT: 0-10

## 2024-10-09 ASSESSMENT — PAIN SCALES - GENERAL: PAINLEVEL_OUTOF10: 0 - NO PAIN

## 2024-10-09 ASSESSMENT — ENCOUNTER SYMPTOMS: OCCASIONAL FEELINGS OF UNSTEADINESS: 0

## 2024-10-09 NOTE — PROGRESS NOTES
AUDIOLOGY ADULT AUDIOMETRIC EVALUATION      Name:  Wilfredo Hinds  :  1975  Age:  48 y.o.  Date of Evaluation: 10/09/24    History:  Reason for visit:  Mr. Wilfredo Hinds was seen today as part of the visit with Arthur Bullock D.O. for an evaluation of hearing.   Chief Complaint   Patient presents with    Hearing Loss    Tinnitus      Reported for the past few years he has noticed a gradual loss of hearing sensitivity in each ear. Stated he has noticed significant difficulty hearing in the presence of any background noise. Mentioned difficulty hearing his daughter and difficulty hearing at work. Reported there has been some air compression noise or fan noise at work and he may miss what was said, or ask for some repetition. Reported that he has been utilizing more lip-reading and has been asking for repetition as speech may not always sound clear. Currently believes the left ear is worse then the right.   Stated he did utilize loud firearms when he was younger and his hearing protection may not have always been in the proper position. 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   He has noticed some intermittent random tinnitus in each ear. Stated the tinnitus will occur suddenly and will be very loud and bothersome for a few seconds.   Denied any otalgia, aural fullness, ear pressure, dizziness/vertigo, ear surgery, recent ear/sinus infections, recent falls, sinus/throat concerns, ear drainage, or sudden hearing loss.    EVALUATION     See Audiogram    RESULTS:    Otoscopic Evaluation:   Right Ear: Otoscopy revealed a clear healthy canal and a healthy tympanic membrane was visualized.   Left Ear: Otoscopy revealed a clear healthy canal and a healthy tympanic membrane was visualized.     Immittance:  Immittance Measures: 226 Hz   Right Ear: Tympanometric testing revealed a normal type A tympanogram with normal middle ear pressure and normal  static compliance.  Left Ear: Tympanometric testing revealed a normal type A tympanogram with normal middle ear pressure and normal static compliance.    Right Ear: Ipsilateral acoustic reflexes were present at, 500-4,000 Hz, at expected sensation levels.  Left Ear: Ipsilateral acoustic reflexes were present at, 500-2,000 Hz, at expected sensation levels and absent at 4,000 Hz.    Test technique:  Pure Tone Audiometry via insert earphones    Reliability:   excellent    Pure Tone Audiometry:    Right Ear: Audiometric testing indicated normal peripheral hearing sensitivity through 4,000 Hz, sloping to a mild notched sensorineural haring loss centered at 6,000 Hz, rising to normal hearing sensitivity above.   Left Ear:   Audiometric testing indicated normal peripheral hearing sensitivity through 3,000 Hz, precipitously sloping to a severe rising to mild notched sensorineural hearing loss centered around 4,000 Hz.   Negative Genna noted at 4,000 Hz, consistent with reliable auditory thresholds.       Speech Audiometry:   Right Ear:  Speech Reception Threshold (SRT) was obtained at 15 dBHL                  Word Recognition scores were excellent (100%) in quiet when words were presented at 55 dBHL  Left Ear:  Speech Reception Threshold (SRT) was obtained at  20 dBHL                  Word Recognition scores were excellent (100%) in quiet when words were presented at 65 dBHL  Testing was performed with recorded NU-6 speech words in quiet. Speech thresholds were in good agreement with the pure tone averages in each ear.     IMPRESSIONS:  Today's test results are hearing loss requiring medical/otologic follow-up.  The patient was counseled with regard to the findings.    RECOMMENDATIONS:  * Continue medical follow up with Arthur Bullock D.O.  * Retest as medically indicated, or sooner if a change in hearing sensitivity or tinnitus is noticed.   * Wear hearing protection while in the presence of loud sounds.   * Use tinnitus  coping strategies as needed, such as sound apps on a smart phone, utilizing calming noise in the room, running a fan at night, etc.   * Pending medical management and patient desire, consider returning for a hearing aid evaluation to discuss amplification options and communication needs. The patient was instructed to call their insurance to check for any hearing aid benefits and to determine if [facility] was in network for hearing aids. The patient is a borderline candidate, but may consider hearing aids to assist with noise reduction and speech enhancement.   * Use effective communication strategies such as asking the speaker to gain attention prior to speaking, speaking in the same room, repeating words that were heard, etc.  * Consider use of T.V. Ears, or like device, while watching television.    PATIENT EDUCATION:   Discussed results and recommendations with the patient and a copy of the hearing test was provided.  Questions were addressed and the patient was encouraged to contact our department should concerns arise.  The patient was seen from  9:00-9:30 am.

## 2024-10-09 NOTE — PROGRESS NOTES
Impression:  1. Asymmetrical sensorineural hearing loss        2. Bilateral sensorineural hearing loss             RECOMMENDATIONS/PLAN :  I reassured the patient that medically his ears look excellent.  We will continue to follow his hearing loss closely and get a repeat audiogram in 1 year.  I did mention that he would be a good candidate for hearing aids if his hearing continues to decline.  He will protect his hearing from any future loud noise exposure.      **This electronic medical record note was created with the use of voice recognition software.  Despite proofreading, typographical or grammatical errors may be present that could affect meaning of content **    Subjective   Patient ID:     Wilfredo Hinds is a 48 y.o. male who presents to the office today with a gradual onset of hearing loss over many years.  He has had previous loud noise exposure where he was shooting guns in his left ear was not protected.  He feels like his hearing is worse in the left ear compared to the right.  This is longstanding.  No history of middle ear infections or drainage from the ears.  No other upper respiratory complaints.  He denies any neurologic complaints or vertigo.    ROS:  A detailed 12 system review of systems is noted on the intake form has been reviewed with the patient with details noted in the HPI and scanned into the patient's medical record.    Objective     Past Medical History:   Diagnosis Date    Hyperlipidemia     URI with cough and congestion 08/30/2024        Past Surgical History:   Procedure Laterality Date    OTHER SURGICAL HISTORY  03/30/2021    No history of surgery        No Known Allergies       Current Outpatient Medications:     atorvastatin (Lipitor) 10 mg tablet, Take 1 tablet (10 mg) by mouth once daily., Disp: 90 tablet, Rfl: 0    ergocalciferol (Vitamin D-2) 1.25 MG (14746 UT) capsule, Take 1 capsule (50,000 Units) by mouth 1 (one) time per week., Disp: 12 capsule, Rfl: 0    buPROPion SR  "(Wellbutrin SR) 150 mg 12 hr tablet, Take 1 tablet (150 mg) by mouth 2 times a day. Do not crush, chew, or split., Disp: 60 tablet, Rfl: 11     Tobacco Use: High Risk (10/9/2024)    Patient History     Smoking Tobacco Use: Every Day     Smokeless Tobacco Use: Never     Passive Exposure: Not on file        Alcohol Use: Not on file        Social History     Substance and Sexual Activity   Drug Use Yes    Types: Marijuana        Physical Exam:  Visit Vitals  BP (!) 158/105   Temp 36.4 °C (97.5 °F) (Temporal)   Ht 1.778 m (5' 10\")   BMI 23.68 kg/m²   Smoking Status Every Day   BSA 1.92 m²      General: Patient is alert, oriented, cooperative in no apparent distress.  Head: Normocephalic, atraumatic.  Eyes: PERRL, EOMI, Conjunctiva is clear. No nystagmus.  Ears: Right Ear-- Pinna is normal.  External auditory canal is patent. Tympanic membrane is [intact, translucent and has good mobility with my pneumatic otoscope. No effusion].  Mastoid is nontender.  Left ear-- Pinna is normal.  External auditory canal is patent. Tympanic membrane is [intact, translucent and has good mobility with my pneumatic otoscope.  No effusion].  Mastoid is nontender.  Nose: Septum is straight.  No septal perforation or lesions. No septal hematoma/ seroma.  No signs of bleeding.  Inferior turbinates are normal.   No evidence of intranasal polyps.  No infectious drainage.  Throat:  Floor of mouth is clear, no masses.  Tongue appears normal, no lesions or masses. Gums, gingiva, buccal mucosa appear pink and moist, no lesions. Teeth are in good repair.  No obvious dental infections.  Peritonsillar regions appear symmetric without swelling.  Hard and soft palate appear normal, no obvious cleft. Uvula is midline.  Oropharynx: No lesions. Retropharyngeal wall is flat.  No active postnasal drip.  Neck: Supple,  no lymphadenopathy.  No masses.  Salivary Glands: Symmetric bilaterally.  No palpable masses.  No evidence of acute infection or salivary " stones  Neurologic: Cranial Nerves 2-12 are grossly intact without focal deficits. Cerebellar function testing is normal.     Results:   I reviewed his recent audiogram and he has essentially normal hearing through the speech range with a high-frequency sensorineural loss in both ears and this is asymmetric and slightly worse in the left ear.  Tympanic membrane's have normal mobility today.  Word recognition scores are 100% bilaterally.  Speech reception threshold is 15 dB in the right ear and 20 dB in the left ear    Procedure:   []    Arthur Bullock, DO

## 2025-09-05 ENCOUNTER — APPOINTMENT (OUTPATIENT)
Dept: PRIMARY CARE | Facility: CLINIC | Age: 50
End: 2025-09-05
Payer: COMMERCIAL